# Patient Record
Sex: MALE | Race: BLACK OR AFRICAN AMERICAN | NOT HISPANIC OR LATINO | URBAN - METROPOLITAN AREA
[De-identification: names, ages, dates, MRNs, and addresses within clinical notes are randomized per-mention and may not be internally consistent; named-entity substitution may affect disease eponyms.]

---

## 2022-08-29 ENCOUNTER — EMERGENCY (EMERGENCY)
Facility: HOSPITAL | Age: 32
LOS: 0 days | Discharge: HOME | End: 2022-08-29
Attending: EMERGENCY MEDICINE | Admitting: EMERGENCY MEDICINE

## 2022-08-29 VITALS
WEIGHT: 192.02 LBS | OXYGEN SATURATION: 100 % | DIASTOLIC BLOOD PRESSURE: 66 MMHG | TEMPERATURE: 97 F | RESPIRATION RATE: 17 BRPM | HEART RATE: 57 BPM | SYSTOLIC BLOOD PRESSURE: 120 MMHG | HEIGHT: 72 IN
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
## 2022-08-29 DIAGNOSIS — S90.425A BLISTER (NONTHERMAL), LEFT LESSER TOE(S), INITIAL ENCOUNTER: ICD-10-CM

## 2022-08-29 PROCEDURE — 99282 EMERGENCY DEPT VISIT SF MDM: CPT

## 2022-08-29 NOTE — ED ADULT NURSE NOTE - NSIMPLEMENTINTERV_GEN_ALL_ED
Implemented All Universal Safety Interventions:  [place] to call system. Call bell, personal items and telephone within reach. Instruct patient to call for assistance. Room bathroom lighting operational. Non-slip footwear when patient is off stretcher. Physically safe environment: no spills, clutter or unnecessary equipment. Stretcher in lowest position, wheels locked, appropriate side rails in place.

## 2022-08-29 NOTE — ED PROVIDER NOTE - PHYSICAL EXAMINATION
GENERAL: Well-developed; well-nourished; in no acute distress.   SKIN: Deroofed blisters noted between L 4th and 5th toes.  No erythema, discharge or TTP.   CARD: S1, S2 normal; no murmurs, gallops, or rubs. Regular rate and rhythm.   RESP: LCTAB; No wheezes, rales, rhonchi, or stridor.  EXT: DP and PT pulses 2+/4 bilaterally. Moving L 4th and 5 toes, sensation intact.   NEURO: Alert, oriented, grossly unremarkable  PSYCH: Cooperative, appropriate.

## 2022-08-29 NOTE — ED PROVIDER NOTE - NSFOLLOWUPINSTRUCTIONS_ED_ALL_ED_FT
Our Emergency Department Referral Coordinators will be reaching out ot you in the next 24-48 hours from 9:00am to 5:00pm (Monday to Friday) with a follow up appointment. Please expect a phone call from the hospital in that time frame. If you do not receive a call or if you have any questions or concerns, you can reach them at [phone] or [phone].    Blisters, Adult  A blister is a raised bubble of skin filled with liquid. Blisters often develop in an area of the skin that repeatedly rubs or presses against another surface (friction blister). Friction blisters can occur on any part of the body, but they usually develop on the hands or feet. Long-term pressure on the same area of the skin can also lead to areas of hardened skin (calluses).    What are the causes?  A blister can be caused by:    An injury.  A burn.  An allergic reaction.  An infection.  Exposure to irritating chemicals.  Friction, especially in an area with a lot of heat and moisture.    Friction blisters often result from:    Sports.  Repetitive activities.  Using tools and doing other activities without wearing gloves.  Shoes that are too tight or too loose.    What are the signs or symptoms?  A blister is often round and looks like a bump. It may:    Itch.  Be painful to the touch.    Before a blister forms, the skin may:    Become red.  Feel warm.  Itch.  Be painful to the touch.    How is this diagnosed?  A blister is diagnosed with a physical exam.    How is this treated?  Treatment usually involves protecting the area where the blister has formed until the skin has healed. Other treatments may include:    A bandage (dressing) to cover the blister.  Extra padding around and over the blister, so that it does not rub on anything.  Antibiotic ointment.    Most blisters break open, dry up, and go away on their own within 1–2 weeks. Blisters that are very painful may be drained before they break open on their own. If the blister is large or painful, it can be drained by:    Sterilizing a small needle with rubbing alcohol.    Washing your hands with soap and water.    Inserting the needle in the edge of the blister to make a small hole. Some fluid will drain out of the hole. Let the top or roof of the blister stay in place. This helps the skin heal.    Washing the blister with mild soap and water.    Covering the blister with antibiotic ointment, if prescribed by your health care provider, and a dressing.      Some blisters may need to be drained by a health care provider.    Follow these instructions at home:  Protect the area where the blister has formed as told by your health care provider.  Keep your blister clean and dry. This helps to prevent infection.  Do not pop your blister. This can cause infection.  ImageIf you were prescribed an antibiotic, use it as told by your health care provider. Do not stop using the antibiotic even if your condition improves.  Wear different shoes until the blister heals.  Avoid the activity that caused the blister until your blister heals.  Check your blister every day for signs of infection. Check for:    More redness, swelling, or pain.  More fluid or blood.  Warmth.  Pus or a bad smell.  The blister getting better and then getting worse.    How is this prevented?  Taking these steps can help to prevent blisters that are caused by friction. Make sure you:    Wear comfortable shoes that fit well.  Always wear socks with shoes.  Wear extra socks or use tape, bandages, or pads over blister-prone areas as needed. You may also apply petroleum jelly under bandages in blister-prone areas.  Wear protective gear, such as gloves, when participating in sports or activities that can cause blisters.  Wear loose-fitting, moisture-wicking clothes when participating in sports or activities.  Use powders as needed to keep your feet dry.    Contact a health care provider if:  You have more redness, swelling, or pain around your blister.  You have more fluid or blood coming from your blister.  Your blister feels warm to the touch.  You have pus or a bad smell coming from your blister.  You have a fever or chills.  Your blister gets better and then it gets worse.  This information is not intended to replace advice given to you by your health care provider. Make sure you discuss any questions you have with your health care provider.

## 2022-08-29 NOTE — ED PROVIDER NOTE - NS ED ROS FT
Constitutional: No fevers, chills, or malaise.  HEENT: No headache  MS:  No myalgia, muscle weakness.  Skin:  Reports blister

## 2022-08-29 NOTE — ED PROVIDER NOTE - CARE PLAN
1 Principal Discharge DX:	Blister of foot   Principal Discharge DX:	Blister of foot  Assessment and plan of treatment:	Plan: Wound care with bacitracin, Band-Aid placed. Patient aware of proper shoes.

## 2022-08-29 NOTE — ED ADULT NURSE NOTE - NS ED NOTE ABUSE RESPONSE YN
Called patient told him that if everything was well he did not need to come see Dr. Hubbard but follow up with Dr. Parnell in 3-4 months. Lupe   Yes

## 2022-08-29 NOTE — ED PROVIDER NOTE - CLINICAL SUMMARY MEDICAL DECISION MAKING FREE TEXT BOX
Patient is a good candidate to attempt outpatient management. Supportive care and home care discussed in detail. Patient aware they may have to return for re-evaluation and possible admission if outpatient treatment fails. Strict return precautions discussed.  Will follow up with podiatry as discussed.

## 2022-08-29 NOTE — ED PROVIDER NOTE - ATTENDING CONTRIBUTION TO CARE
32-year-old male with no significant past medical history presents with blisters between his fourth and fifth toes bilaterally since last week for which she was placing Neosporin.  Patient reports he has been wearing sneakers with socks on and noticed that there was raw skin and wanted to make sure was not infected.  denies fever, chills, n/v, numbness/tingling, decreased sensation, weakness, ankle pain, knee pain, hip pain, swelling or ecchymoses.    On Exam"  Vital Signs: I have reviewed the initial vital signs. Constitutional: Non toxic appearing pt sitting on stretcher in nad. Integumentary: No rash. Blisters bw R 4th and 5th toes, no erythema  or streaking, no warmth to palpation, no crepitus, induration, fluctuance, no discharge, no signs of trauma, no abscess, (+) soft compartments. No ecchymoses or swelling. Cardiovascular: DP and PT pulses 2/4 b/l. Musculoskeletal: ROM of b/l knees in flexion, extension, FROM of b/l ankles in inversion and eversion. Able to plantar and dorsi flex. (-) edema, no calf pain/swelling/erythema. No pain to palpation to knee, or hips, no pain to palpation to patella, fibular head, or metatarsals.  Neurologic: AAOx3, motor 5/5 and sensation intact throughout upper and lowe ext, pt able to ambulate w/ no pain or issues.

## 2022-08-29 NOTE — ED PROVIDER NOTE - PATIENT PORTAL LINK FT
You can access the FollowMyHealth Patient Portal offered by [facility] by registering at the following website: [email or web address]. By joining [facility]’s FollowMyHealth portal, you will also be able to view your health information using other applications (apps) compatible with our system.

## 2022-08-29 NOTE — ED PROVIDER NOTE - CARE PROVIDER_API CALL
Hoang Betancourt (DPM)  Surgery  [address] [place] [address]  Phone: [phone]  Fax: [phone]  Follow Up Time: 1-3 Days

## 2022-08-29 NOTE — ED PROVIDER NOTE - PROGRESS NOTE DETAILS
ED Attending ELADIA Zhou  pt aware of proper wound care, signs and symptoms to return for, will follow up with podiatry.

## 2022-08-29 NOTE — ED PROVIDER NOTE - OBJECTIVE STATEMENT
33 yo male w/ no PMH presents for blister evaluation.  Last week, had walked long distance and noted blisters after between his L 4th and 5th toes.  Blisters deroofed, was concerned that it may be infected so came into ED for eval.  No fevers or discharge. Currently has no pain at site.

## 2022-08-29 NOTE — ED PROVIDER NOTE - NSFOLLOWUPCLINICS_GEN_ALL_ED_FT
[facility]  Podiatry  .  [place]   Phone: [phone]  Fax:   Follow Up Time: 1-3 Days